# Patient Record
Sex: FEMALE | URBAN - METROPOLITAN AREA
[De-identification: names, ages, dates, MRNs, and addresses within clinical notes are randomized per-mention and may not be internally consistent; named-entity substitution may affect disease eponyms.]

---

## 2021-02-19 ENCOUNTER — WEB ENCOUNTER (OUTPATIENT)
Dept: URBAN - METROPOLITAN AREA CLINIC 40 | Facility: CLINIC | Age: 66
End: 2021-02-19

## 2021-02-23 ENCOUNTER — DASHBOARD ENCOUNTERS (OUTPATIENT)
Age: 66
End: 2021-02-23

## 2021-02-23 ENCOUNTER — OFFICE VISIT (OUTPATIENT)
Dept: URBAN - METROPOLITAN AREA TELEHEALTH 2 | Facility: TELEHEALTH | Age: 66
End: 2021-02-23
Payer: COMMERCIAL

## 2021-02-23 DIAGNOSIS — A04.72 CLOSTRIDIUM DIFFICILE COLITIS: ICD-10-CM

## 2021-02-23 DIAGNOSIS — R19.7 DIARRHEA OF PRESUMED INFECTIOUS ORIGIN: ICD-10-CM

## 2021-02-23 PROCEDURE — 99213 OFFICE O/P EST LOW 20 MIN: CPT | Performed by: INTERNAL MEDICINE

## 2021-02-23 NOTE — HPI-TODAY'S VISIT:
The patient is a 65 year old female, who presents on referral from Chris Todd MD, for a gastroenterology evaluation for diarrhea which has been postprandial and watery, and she has been having about 2 bowel movements per day when the symptoms occur. A copy of this document will be sent to the referring provider.  symptoms started after Covid infection in Dec. still has loss of taste/smell. She describes the cramping as mild in severity. She has not traveled out of the country.  CT 2 years ago showed acute sigmoid diverticulitis. developed c.diff after antibiotics for it.diarrhea was better after dificid. colonoscopy in 2019-IH/diverticulosis, else normal. thinks she might have c.diff again though she has not had any recent antibiotics. Had steroids for covid

## 2021-02-26 PROBLEM — 423590009: Status: ACTIVE | Noted: 2021-02-26

## 2021-03-02 ENCOUNTER — LAB OUTSIDE AN ENCOUNTER (OUTPATIENT)
Dept: URBAN - METROPOLITAN AREA CLINIC 40 | Facility: CLINIC | Age: 66
End: 2021-03-02

## 2021-03-09 LAB — GASTROINTESTINAL PATHOGEN: (no result)

## 2024-09-03 ENCOUNTER — OFFICE VISIT (OUTPATIENT)
Dept: URBAN - METROPOLITAN AREA CLINIC 74 | Facility: CLINIC | Age: 69
End: 2024-09-03
Payer: MEDICARE

## 2024-09-03 ENCOUNTER — LAB OUTSIDE AN ENCOUNTER (OUTPATIENT)
Dept: URBAN - METROPOLITAN AREA CLINIC 74 | Facility: CLINIC | Age: 69
End: 2024-09-03

## 2024-09-03 VITALS
DIASTOLIC BLOOD PRESSURE: 70 MMHG | SYSTOLIC BLOOD PRESSURE: 138 MMHG | BODY MASS INDEX: 34.87 KG/M2 | HEART RATE: 83 BPM | HEIGHT: 66 IN | TEMPERATURE: 98.6 F | WEIGHT: 217 LBS

## 2024-09-03 DIAGNOSIS — K57.92 DIVERTICULITIS: ICD-10-CM

## 2024-09-03 DIAGNOSIS — R10.824 LEFT LOWER QUADRANT ABDOMINAL TENDERNESS WITH REBOUND TENDERNESS: ICD-10-CM

## 2024-09-03 DIAGNOSIS — R10.32 LLQ ABDOMINAL PAIN: ICD-10-CM

## 2024-09-03 DIAGNOSIS — K57.50 DIVERTICUL DISEASE SMALL AND LARGE INTESTINE, NO PERFORATI OR ABSCESS: ICD-10-CM

## 2024-09-03 PROBLEM — 397881000: Status: ACTIVE | Noted: 2024-09-03

## 2024-09-03 PROCEDURE — 99204 OFFICE O/P NEW MOD 45 MIN: CPT | Performed by: PHYSICIAN ASSISTANT

## 2024-09-03 RX ORDER — DICYCLOMINE HYDROCHLORIDE 10 MG/1
1 CAPSULE 30 MINUTES BEFORE EATING CAPSULE ORAL THREE TIMES A DAY
Qty: 180 | Refills: 1 | OUTPATIENT
Start: 2024-09-04 | End: 2025-03-02

## 2024-09-03 NOTE — PHYSICAL EXAM GASTROINTESTINAL
Abdomen , soft, tender at LLQ , nondistended , no guarding or rigidity , no masses palpable , normal bowel sounds , Liver and Spleen , no hepatomegaly present , no hepatosplenomegaly , liver nontender , spleen not palpable

## 2024-09-03 NOTE — HPI-TODAY'S VISIT:
The patient is 68-year-old female with past medical history as noted below known to Dr. Isaac last was seen in 2021 is presenting to our clinic today with acute onset of abdominal pain. The patient with acute onset of LLQ abdominal pain, cramping, frequent bowel movements, and nausea since Sunday. She states that she has similar symptoms in 2019 with diagnosis of diverticulitis. No other GI issues today.    -- The patient denies dyspepsia, dysphagia, odynophagia, hemoptysis, hematemesis, vomiting, regurgitation, melena, constipation, diarrhea, hematochezia, fever, chills, chest pain, SOB, or any other GI complaints today.   -- The patient denies ETOH, Tobacco, and Illicit drug use.   -- The patient is not up to date with Flu, Pneumonia, Shingles, and COVID vaccine.  -- Denies NSAID's.   Procedure: -- Colonoscopy on 12/0/2019 by Dr. Isaac noted perianal skin tags found on perianal exam.  Diverticulosis in sigmoid colon, in the descending colon, and transverse colon.  Non-bleeding internal hemorrhoids.  No specimen collected.  Repeat colonoscopy in 10 years for surveillance.

## 2024-09-04 ENCOUNTER — TELEPHONE ENCOUNTER (OUTPATIENT)
Dept: URBAN - METROPOLITAN AREA CLINIC 74 | Facility: CLINIC | Age: 69
End: 2024-09-04

## 2024-09-04 PROBLEM — 85057007: Status: ACTIVE | Noted: 2024-09-04

## 2024-09-04 LAB
A/G RATIO: 1.6
ABSOLUTE BASOPHILS: 50
ABSOLUTE EOSINOPHILS: 170
ABSOLUTE LYMPHOCYTES: 1705
ABSOLUTE MONOCYTES: 460
ABSOLUTE NEUTROPHILS: 2615
ALBUMIN: 4.1
ALKALINE PHOSPHATASE: 73
ALT (SGPT): 20
AST (SGOT): 19
BASOPHILS: 1
BILIRUBIN, TOTAL: 0.4
BUN/CREATININE RATIO: (no result)
BUN: 11
CALCIUM: 9.5
CARBON DIOXIDE, TOTAL: 24
CHLORIDE: 109
CREATININE: 0.89
EGFR: 71
EOSINOPHILS: 3.4
GLOBULIN, TOTAL: 2.5
GLUCOSE: 101
HEMATOCRIT: 42.8
HEMOGLOBIN: 14.2
LYMPHOCYTES: 34.1
MCH: 30.3
MCHC: 33.2
MCV: 91.3
MONOCYTES: 9.2
MPV: 11.4
NEUTROPHILS: 52.3
PLATELET COUNT: 254
POTASSIUM: 4.3
PROTEIN, TOTAL: 6.6
RDW: 12.4
RED BLOOD CELL COUNT: 4.69
SODIUM: 142
WHITE BLOOD CELL COUNT: 5

## 2024-10-01 ENCOUNTER — OFFICE VISIT (OUTPATIENT)
Dept: URBAN - METROPOLITAN AREA CLINIC 74 | Facility: CLINIC | Age: 69
End: 2024-10-01
Payer: MEDICARE

## 2024-10-01 VITALS
DIASTOLIC BLOOD PRESSURE: 80 MMHG | TEMPERATURE: 98.6 F | BODY MASS INDEX: 29.69 KG/M2 | SYSTOLIC BLOOD PRESSURE: 112 MMHG | HEART RATE: 93 BPM | HEIGHT: 65 IN | WEIGHT: 178.2 LBS

## 2024-10-01 DIAGNOSIS — K76.89 LIVER CYST: ICD-10-CM

## 2024-10-01 DIAGNOSIS — K57.30 ACQUIRED DIVERTICULOSIS OF COLON: ICD-10-CM

## 2024-10-01 DIAGNOSIS — K63.89 EPIPLOIC APPENDAGITIS: ICD-10-CM

## 2024-10-01 PROCEDURE — 99212 OFFICE O/P EST SF 10 MIN: CPT | Performed by: PHYSICIAN ASSISTANT

## 2024-10-01 RX ORDER — DICYCLOMINE HYDROCHLORIDE 10 MG/1
1 CAPSULE 30 MINUTES BEFORE EATING CAPSULE ORAL THREE TIMES A DAY
Qty: 180 | Refills: 1 | Status: ACTIVE | COMMUNITY
Start: 2024-09-04 | End: 2025-03-02

## 2024-10-01 NOTE — HPI-TODAY'S VISIT:
The patient is 68-year-old female with past medical history as noted below known to Dr. Isaac is presenting to our clinic today for follow up appointment. She was informed of her labs and CT. She has been taking Dicyclomine 10 mg as needed with good results. She is doing well except occasional nausea. Otherwise, she moves her bowels daily and no abdominal pain.   Diagnostic studies: -- CT scan of abdomen and pelvis on 09/04/2024 with epiploic appendagitis of the sigmoid colon. Moderate colonic diverticulosis, without evidence of diverticulitis. A cyst of the anterior right hepatic lobe is seen. Otherwise no liver abnormality. The biliary tree is within normal  limits status post cholecystectomy.  -- Labs on 09/03/2024 CMP and CBC are normal.  Procedure: -- Colonoscopy on 12/0/2019 by Dr. Isaac noted perianal skin tags found on perianal exam.  Diverticulosis in sigmoid colon, in the descending colon, and transverse colon.  Non-bleeding internal hemorrhoids.  No specimen collected.  Repeat colonoscopy in 10 years for surveillance.